# Patient Record
(demographics unavailable — no encounter records)

---

## 2024-10-31 NOTE — PHYSICAL EXAM
[No Acute Distress] : no acute distress [Normal Voice/Communication] : normal voice/communication [Speech Grossly Normal] : speech grossly normal [Normal Mood] : the mood was normal [Normal Insight/Judgement] : insight and judgment were intact [de-identified] : Alert

## 2024-10-31 NOTE — PHYSICAL EXAM
[No Acute Distress] : no acute distress [Normal Voice/Communication] : normal voice/communication [Speech Grossly Normal] : speech grossly normal [Normal Mood] : the mood was normal [Normal Insight/Judgement] : insight and judgment were intact [de-identified] : Alert

## 2024-10-31 NOTE — REVIEW OF SYSTEMS
[Fatigue] : fatigue [Fever] : no fever [Abdominal Pain] : no abdominal pain [Nausea] : no nausea [Vomiting] : no vomiting [Suicidal] : not suicidal [Insomnia] : no insomnia [Anxiety] : no anxiety [Depression] : no depression

## 2024-10-31 NOTE — HISTORY OF PRESENT ILLNESS
[FreeTextEntry1] : Patient agrees to video visit.  Both parties have good two way visual and audio capabilities. This appointment to follow-up on increasing escitalopram to 20 mg.  Patient states she has no more depression, anxiety.  She does have low energy and feels tired despite getting 7 to 8 hours sleep. her sleep is usually uninterrupted.  No one has told her that she is snoring or gasping in her sleep. Patient has also noticed that her appetite is decreased.  She has not weighed herself but feels like her clothes are looser. Patient is not having any side effects to the higher dose of citalopram.

## 2025-04-21 NOTE — HEALTH RISK ASSESSMENT
[Yes] : Yes [Monthly or less (1 pt)] : Monthly or less (1 point) [1 or 2 (0 pts)] : 1 or 2 (0 points) [Never (0 pts)] : Never (0 points) [No falls in past year] : Patient reported no falls in the past year [0] : 2) Feeling down, depressed, or hopeless: Not at all (0) [PHQ-2 Negative - No further assessment needed] : PHQ-2 Negative - No further assessment needed [Audit-CScore] : 1 [[id number]] : 0 [Never] : Never

## 2025-04-21 NOTE — PLAN
[FreeTextEntry1] : Care established Medical hx reviewed Return for annual physical in August 2025 Medications refilled

## 2025-04-21 NOTE — HISTORY OF PRESENT ILLNESS
[FreeTextEntry8] : 30 yo F here to establish care with new PCP and obtain refills on depression/anxiety medications. Patient reports no acute concerns or complaints at this time. 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 Now patient lives in [place] with her two young children, both healthy. 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